# Patient Record
Sex: FEMALE | Race: WHITE | NOT HISPANIC OR LATINO | URBAN - METROPOLITAN AREA
[De-identification: names, ages, dates, MRNs, and addresses within clinical notes are randomized per-mention and may not be internally consistent; named-entity substitution may affect disease eponyms.]

---

## 2018-05-09 PROBLEM — 23056005 SCIATICA: Status: ACTIVE | Noted: 2018-05-09

## 2018-05-09 PROBLEM — 3723001 ARTHRITIS: Status: ACTIVE | Noted: 2018-05-09

## 2018-05-09 PROBLEM — 193462001 INSOMNIA: Status: ACTIVE | Noted: 2018-05-09

## 2019-05-31 PROBLEM — 235595009 GASTROESOPHAGEAL REFLUX DISEASE: Status: ACTIVE | Noted: 2019-05-31

## 2019-05-31 PROBLEM — 35489007 DEPRESSION: Status: ACTIVE | Noted: 2019-05-31

## 2019-06-03 PROBLEM — 115329001 MRSA: Status: ACTIVE | Noted: 2019-06-03

## 2019-06-24 PROBLEM — 69896004 RHEUMATOID ARTHRITIS: Status: ACTIVE | Noted: 2019-06-24

## 2019-06-24 PROBLEM — 200936003 LUPUS: Status: ACTIVE | Noted: 2019-06-24

## 2021-08-28 ENCOUNTER — TELEPHONE ENCOUNTER (OUTPATIENT)
Dept: URBAN - METROPOLITAN AREA CLINIC 13 | Facility: CLINIC | Age: 51
End: 2021-08-28

## 2021-08-28 RX ORDER — ATORVASTATIN CALCIUM 10 MG/1
TABLET ORAL
OUTPATIENT
End: 2019-06-03

## 2021-08-28 RX ORDER — QUETIAPINE FUMARATE 50 MG/1
TABLET, FILM COATED ORAL
OUTPATIENT
End: 2019-06-03

## 2021-08-28 RX ORDER — METOCLOPRAMIDE 10 MG/1
TABLET ORAL
OUTPATIENT
End: 2019-06-03

## 2021-08-28 RX ORDER — TRAMADOL HYDROCHLORIDE 50 MG/1
TABLET, FILM COATED ORAL
OUTPATIENT
End: 2019-06-03

## 2021-08-28 RX ORDER — ESOMEPRAZOLE MAGNESIUM 40 MG/1
FOR SUSPENSION ORAL
OUTPATIENT
End: 2019-06-03

## 2021-08-29 ENCOUNTER — TELEPHONE ENCOUNTER (OUTPATIENT)
Dept: URBAN - METROPOLITAN AREA CLINIC 13 | Facility: CLINIC | Age: 51
End: 2021-08-29

## 2021-08-29 RX ORDER — MODAFINIL 200 MG/1
TABLET ORAL
Status: ACTIVE | COMMUNITY

## 2021-08-29 RX ORDER — QUETIAPINE FUMARATE 300 MG/1
TABLET, FILM COATED ORAL
Status: ACTIVE | COMMUNITY

## 2021-08-29 RX ORDER — IBUPROFEN 400 MG/1
TABLET, FILM COATED ORAL
Status: ACTIVE | COMMUNITY

## 2021-08-29 RX ORDER — CYCLOBENZAPRINE HYDROCHLORIDE 10 MG/1
TABLET, FILM COATED ORAL
Status: ACTIVE | COMMUNITY

## 2022-07-07 ENCOUNTER — OFFICE VISIT (OUTPATIENT)
Dept: URBAN - METROPOLITAN AREA CLINIC 46 | Facility: CLINIC | Age: 52
End: 2022-07-07

## 2022-07-12 ENCOUNTER — DASHBOARD ENCOUNTERS (OUTPATIENT)
Age: 52
End: 2022-07-12

## 2022-07-13 ENCOUNTER — OFFICE VISIT (OUTPATIENT)
Dept: URBAN - METROPOLITAN AREA CLINIC 44 | Facility: CLINIC | Age: 52
End: 2022-07-13

## 2022-07-13 RX ORDER — ALBUTEROL SULFATE 2.5 MG/3ML
SOLUTION RESPIRATORY (INHALATION)
Qty: 150 MILLILITER | Refills: 3 | Status: ACTIVE | COMMUNITY

## 2022-07-13 RX ORDER — TRAZODONE HYDROCHLORIDE 50 MG/1
TAKE 1 TABLET BY MOUTH ONCE DAILY AT BEDTIME AS NEEDED TABLET ORAL
Qty: 30 EACH | Refills: 1 | Status: ACTIVE | COMMUNITY

## 2022-07-13 RX ORDER — PREGABALIN 100 MG/1
CAPSULE ORAL
Qty: 60 APPLICATOR | Refills: 0 | Status: ACTIVE | COMMUNITY

## 2022-07-13 RX ORDER — TIZANIDINE 4 MG/1
1 TABLET AS NEEDED TABLET ORAL THREE TIMES A DAY
Status: ACTIVE | COMMUNITY

## 2022-07-13 RX ORDER — TRAMADOL HYDROCHLORIDE 50 MG/1
1 TABLET AS NEEDED TABLET, FILM COATED ORAL ONCE A DAY
Status: ACTIVE | COMMUNITY

## 2022-07-13 RX ORDER — HYDROXYCHLOROQUINE SULFATE 200 MG/1
AS DIRECTED TABLET, FILM COATED ORAL
Status: ACTIVE | COMMUNITY

## 2022-07-13 RX ORDER — CILOSTAZOL 100 MG/1
TABLET ORAL
Qty: 180 TABLET | Refills: 0 | Status: ACTIVE | COMMUNITY

## 2022-07-13 RX ORDER — ATORVASTATIN CALCIUM 20 MG/1
TABLET, FILM COATED ORAL
Qty: 30 TABLET | Refills: 1 | Status: ACTIVE | COMMUNITY

## 2022-07-13 RX ORDER — MODAFINIL 200 MG/1
TABLET ORAL
Status: ACTIVE | COMMUNITY

## 2022-07-13 RX ORDER — QUETIAPINE 400 MG/1
1 TABLET AT BEDTIME TABLET, FILM COATED ORAL ONCE A DAY
Status: ACTIVE | COMMUNITY

## 2022-07-13 RX ORDER — HYDROCHLOROTHIAZIDE 12.5 MG/1
TABLET ORAL
Qty: 30 TABLET | Refills: 1 | Status: ACTIVE | COMMUNITY

## 2022-07-13 RX ORDER — METHOCARBAMOL TABLETS 750 MG/1
TABLET, COATED ORAL
Qty: 90 CAPLET | Refills: 0 | Status: ACTIVE | COMMUNITY

## 2022-07-13 RX ORDER — GABAPENTIN 100 MG/1
CAPSULE ORAL
Qty: 60 APPLICATOR | Refills: 1 | Status: ACTIVE | COMMUNITY

## 2022-07-13 NOTE — HPI-TODAY'S VISIT:
52 year old female with a history of  bariatric surgery/gastric sleeve in 2012 EGD was done 6/24/2019 and showed LA Grade A esophagitis, gastric sleeve scar tissue and states chronic severe reflux since despite PPI BID. After meals acid regurgitation into throat and often bilious emesis in sleep. Had two EGD with balloon dilations with last in 2014 with minimal improvement; last saw surgeon in 2014 who advised conversion to a [facility] but pt left the state and has not been able to see a local bariatric surgeon since. Now presents for a diagnostic EGD  CT scan 12/21/17 compared to 12/8/2017 showed mild hepatomegaly with probable fatty infiltration, partial loss of haustra mildly prominent para-aortic lymph nodes.